# Patient Record
Sex: MALE | Race: BLACK OR AFRICAN AMERICAN | Employment: FULL TIME | URBAN - METROPOLITAN AREA
[De-identification: names, ages, dates, MRNs, and addresses within clinical notes are randomized per-mention and may not be internally consistent; named-entity substitution may affect disease eponyms.]

---

## 2017-10-17 ENCOUNTER — APPOINTMENT (OUTPATIENT)
Dept: CT IMAGING | Facility: HOSPITAL | Age: 22
End: 2017-10-17
Attending: EMERGENCY MEDICINE
Payer: OTHER MISCELLANEOUS

## 2017-10-17 ENCOUNTER — HOSPITAL ENCOUNTER (EMERGENCY)
Facility: HOSPITAL | Age: 22
Discharge: HOME | End: 2017-10-17
Attending: EMERGENCY MEDICINE
Payer: OTHER MISCELLANEOUS

## 2017-10-17 ENCOUNTER — APPOINTMENT (OUTPATIENT)
Dept: GENERAL RADIOLOGY | Facility: HOSPITAL | Age: 22
End: 2017-10-17
Attending: EMERGENCY MEDICINE
Payer: OTHER MISCELLANEOUS

## 2017-10-17 VITALS
RESPIRATION RATE: 16 BRPM | TEMPERATURE: 98 F | BODY MASS INDEX: 27.47 KG/M2 | OXYGEN SATURATION: 100 % | HEIGHT: 67 IN | WEIGHT: 175 LBS | SYSTOLIC BLOOD PRESSURE: 136 MMHG | HEART RATE: 58 BPM | DIASTOLIC BLOOD PRESSURE: 82 MMHG

## 2017-10-17 DIAGNOSIS — E87.6 HYPOKALEMIA: ICD-10-CM

## 2017-10-17 DIAGNOSIS — S09.8XXA BLUNT HEAD TRAUMA, INITIAL ENCOUNTER: Primary | ICD-10-CM

## 2017-10-17 PROCEDURE — 99285 EMERGENCY DEPT VISIT HI MDM: CPT

## 2017-10-17 PROCEDURE — 80053 COMPREHEN METABOLIC PANEL: CPT | Performed by: EMERGENCY MEDICINE

## 2017-10-17 PROCEDURE — 84484 ASSAY OF TROPONIN QUANT: CPT | Performed by: EMERGENCY MEDICINE

## 2017-10-17 PROCEDURE — 96360 HYDRATION IV INFUSION INIT: CPT

## 2017-10-17 PROCEDURE — 71020 XR CHEST PA + LAT CHEST (CPT=71020): CPT | Performed by: EMERGENCY MEDICINE

## 2017-10-17 PROCEDURE — 96361 HYDRATE IV INFUSION ADD-ON: CPT

## 2017-10-17 PROCEDURE — 80307 DRUG TEST PRSMV CHEM ANLYZR: CPT | Performed by: EMERGENCY MEDICINE

## 2017-10-17 PROCEDURE — 80320 DRUG SCREEN QUANTALCOHOLS: CPT | Performed by: EMERGENCY MEDICINE

## 2017-10-17 PROCEDURE — 85025 COMPLETE CBC W/AUTO DIFF WBC: CPT | Performed by: EMERGENCY MEDICINE

## 2017-10-17 PROCEDURE — 93010 ELECTROCARDIOGRAM REPORT: CPT

## 2017-10-17 PROCEDURE — 70450 CT HEAD/BRAIN W/O DYE: CPT | Performed by: EMERGENCY MEDICINE

## 2017-10-17 PROCEDURE — 93005 ELECTROCARDIOGRAM TRACING: CPT

## 2017-10-17 RX ORDER — IBUPROFEN 600 MG/1
600 TABLET ORAL ONCE
Status: COMPLETED | OUTPATIENT
Start: 2017-10-17 | End: 2017-10-17

## 2017-10-17 RX ORDER — POTASSIUM CHLORIDE 20 MEQ/1
20 TABLET, EXTENDED RELEASE ORAL ONCE
Status: COMPLETED | OUTPATIENT
Start: 2017-10-17 | End: 2017-10-17

## 2017-10-17 RX ORDER — SODIUM CHLORIDE 9 MG/ML
1000 INJECTION, SOLUTION INTRAVENOUS ONCE
Status: COMPLETED | OUTPATIENT
Start: 2017-10-17 | End: 2017-10-17

## 2017-10-17 NOTE — ED PROVIDER NOTES
Patient Seen in: [facility] Emergency Department    History   Patient presents with:  Fall (musculoskeletal, neurologic)    Stated Complaint: FALL     HPI    Patient is a 19-year-old male who presents emergency room with a history of being at work y 0157]  Temp src: Oral [10/17/17 0157]  SpO2: 100 % [10/17/17 0157]  O2 Device: None (Room air) [10/17/17 0157]    Current:/82   Pulse 58   Temp 98.3 °F (36.8 °C) (Oral)   Resp 16   Ht 170.2 cm (5' 7\")   Wt 79.4 kg   SpO2 100%   BMI 27.41 kg/m² WITH DIFFERENTIAL WITH PLATELET    Narrative: The following orders were created for panel order CBC WITH DIFFERENTIAL WITH PLATELET.   Procedure                               Abnormality         Status                     --------- and does not eat breakfast before going to work. It is uncertain if the patient is having low blood sugar episodes which may be causing his symptoms. Patient states he works long hours and does not sleep well  at all.   The patient is sitting up in breath

## 2018-06-29 ENCOUNTER — HOSPITAL ENCOUNTER (EMERGENCY)
Facility: HOSPITAL | Age: 23
Discharge: HOME | End: 2018-06-29

## 2021-09-07 ENCOUNTER — HOSPITAL ENCOUNTER (OUTPATIENT)
Age: 26
Discharge: HOME | End: 2021-09-07
Payer: MEDICAID

## 2021-09-07 ENCOUNTER — HOSPITAL ENCOUNTER (EMERGENCY)
Facility: HOSPITAL | Age: 26
Discharge: LEFT BEFORE DISCHARGE | End: 2021-09-07
Payer: MEDICAID

## 2021-09-07 VITALS
TEMPERATURE: 99 F | HEIGHT: 68 IN | SYSTOLIC BLOOD PRESSURE: 145 MMHG | OXYGEN SATURATION: 100 % | DIASTOLIC BLOOD PRESSURE: 91 MMHG | BODY MASS INDEX: 22.73 KG/M2 | HEART RATE: 86 BPM | RESPIRATION RATE: 18 BRPM | WEIGHT: 150 LBS

## 2021-09-07 DIAGNOSIS — S39.012A STRAIN OF LUMBAR REGION, INITIAL ENCOUNTER: Primary | ICD-10-CM

## 2021-09-07 PROCEDURE — 99203 OFFICE O/P NEW LOW 30 MIN: CPT

## 2021-09-07 PROCEDURE — 96372 THER/PROPH/DIAG INJ SC/IM: CPT

## 2021-09-07 PROCEDURE — 99204 OFFICE O/P NEW MOD 45 MIN: CPT

## 2021-09-07 RX ORDER — KETOROLAC TROMETHAMINE 30 MG/ML
60 INJECTION, SOLUTION INTRAMUSCULAR; INTRAVENOUS ONCE
Status: COMPLETED | OUTPATIENT
Start: 2021-09-07 | End: 2021-09-07

## 2021-09-07 RX ORDER — LIDOCAINE 50 MG/G
1 PATCH TOPICAL
Qty: 6 PATCH | Refills: 0 | Status: SHIPPED | OUTPATIENT
Start: 2021-09-07

## 2021-09-07 NOTE — ED PROVIDER NOTES
Patient Seen in: [facility]      History   Patient presents with:  Back Pain    Stated Complaint: BACK AND LEG PAIN     HPI/Subjective:   HPI  Patient is 31 remote past medical history of asthma since her back pain over the past 1 month. (!) 145/91   Pulse 86   Temp 98.8 °F (37.1 °C) (Temporal)   Resp 18   Ht 172.7 cm (5' 8\")   Wt 68 kg   SpO2 100%   BMI 22.81 kg/m²         Physical Exam  Vitals and nursing note reviewed. Constitutional:       General: He is not in acute distress.      A strain  Plan; alternate cold warm compresses. Lidoderm patches. Patient received a cold pack while in our department along with Toradol 60 mg IM. Ortho referral.  Tylenol and ibuprofen.   Red flag signs of low back pain discussed patient seek immediate m

## 2021-09-07 NOTE — ED INITIAL ASSESSMENT (HPI)
Co low back pain over the past month. sts for the past 2 days pain has gotten increasingly worse. Pain radiates down right leg. Motrin with little relief.   Denies loss of bowel or bladder

## 2023-05-30 ENCOUNTER — HOSPITAL ENCOUNTER (OUTPATIENT)
Age: 28
Discharge: LEFT BEFORE DISCHARGE | End: 2023-05-30
Payer: MEDICAID

## 2023-05-30 VITALS
HEART RATE: 78 BPM | BODY MASS INDEX: 30 KG/M2 | RESPIRATION RATE: 18 BRPM | OXYGEN SATURATION: 100 % | SYSTOLIC BLOOD PRESSURE: 156 MMHG | DIASTOLIC BLOOD PRESSURE: 86 MMHG | WEIGHT: 200 LBS | TEMPERATURE: 99 F

## (undated) NOTE — ED AVS SNAPSHOT
Jewell Mantilla. MRN: [id number]    Department:  [facility] Emergency Department   Date of Visit:  10/17/2017           Disclosure     Insurance plans vary and the physician(s) referred by the ER may not be covered by your plan.  Please contact If you have been prescribed any medication(s), please fill your prescription right away and begin taking the medication(s) as directed    If the emergency physician has read X-rays, these will be re-interpreted by a radiologist.  If there is a significant

## (undated) NOTE — LETTER
October 20, 2017    Patient: Kim Dickerson. Date of Visit: 10/17/2017       To Whom It May Concern:    Rashida Rock was seen and treated in our emergency department on 10/17/2017. He may return to work today 10/20/2017 without restrictions.     If y

## (undated) NOTE — LETTER
October 17, 2017    Patient: Star Saba. Date of Visit: 10/17/2017       To Whom It May Concern:    Yamileth Richard was seen and treated in our emergency department on 10/17/2017. He should not return to work until OCTOBER 20, 2017.     If you have

## (undated) NOTE — LETTER
Date & Time: 9/7/2021, 5:43 PM  Patient: Naldo Ramon. Encounter Provider(s):    ARLETTE Mascorro       To Whom It May Concern:    Rayne Carter was seen and treated in our department on 9/7/2021.  He should not return to work until September 8, 202